# Patient Record
Sex: MALE | Race: WHITE | NOT HISPANIC OR LATINO | URBAN - METROPOLITAN AREA
[De-identification: names, ages, dates, MRNs, and addresses within clinical notes are randomized per-mention and may not be internally consistent; named-entity substitution may affect disease eponyms.]

---

## 2017-12-21 ENCOUNTER — IMPORTED ENCOUNTER (OUTPATIENT)
Dept: URBAN - METROPOLITAN AREA CLINIC 38 | Facility: CLINIC | Age: 32
End: 2017-12-21

## 2017-12-21 PROBLEM — H40.013 OPEN ANGLE WITH BORDERLINE FINDINGS, LOW RISK, BILATERAL: Noted: 2017-12-21

## 2017-12-21 PROBLEM — H52.13 MYOPIA, BILATERAL: Noted: 2017-12-21

## 2017-12-21 PROCEDURE — 92015 DETERMINE REFRACTIVE STATE: CPT

## 2018-12-21 ENCOUNTER — IMPORTED ENCOUNTER (OUTPATIENT)
Dept: URBAN - METROPOLITAN AREA CLINIC 38 | Facility: CLINIC | Age: 33
End: 2018-12-21

## 2018-12-21 PROBLEM — H40.013 OPEN ANGLE WITH BORDERLINE FINDINGS, LOW RISK, BILATERAL: Noted: 2018-12-21

## 2018-12-21 PROBLEM — H52.13 MYOPIA, BILATERAL: Noted: 2018-12-21

## 2018-12-21 PROCEDURE — 92014 COMPRE OPH EXAM EST PT 1/>: CPT

## 2020-01-16 ENCOUNTER — IMPORTED ENCOUNTER (OUTPATIENT)
Dept: URBAN - METROPOLITAN AREA CLINIC 38 | Facility: CLINIC | Age: 35
End: 2020-01-16

## 2020-01-16 PROBLEM — H40.013 OPEN ANGLE WITH BORDERLINE FINDINGS, LOW RISK, BILATERAL: Noted: 2020-01-16

## 2020-01-16 PROBLEM — H52.13 MYOPIA, BILATERAL: Noted: 2020-01-16

## 2020-01-16 PROCEDURE — 92083 EXTENDED VISUAL FIELD XM: CPT

## 2020-01-16 PROCEDURE — 92014 COMPRE OPH EXAM EST PT 1/>: CPT

## 2021-01-08 ENCOUNTER — IMPORTED ENCOUNTER (OUTPATIENT)
Dept: URBAN - METROPOLITAN AREA CLINIC 38 | Facility: CLINIC | Age: 36
End: 2021-01-08

## 2021-01-08 PROBLEM — H52.13 MYOPIA: Noted: 2021-01-08

## 2021-01-08 PROBLEM — H40.013 GLAUCOMA SUSPECT, LOW RISK (OPTIC NERVE): Noted: 2021-01-08

## 2021-01-08 PROBLEM — H40.013 OPEN ANGLE WITH BORDERLINE FINDINGS, LOW RISK, BILATERAL: Noted: 2021-01-08

## 2021-01-08 PROBLEM — H52.13 MYOPIA, BILATERAL: Noted: 2021-01-08

## 2021-01-08 PROCEDURE — 92015 DETERMINE REFRACTIVE STATE: CPT

## 2021-01-08 PROCEDURE — 92014 COMPRE OPH EXAM EST PT 1/>: CPT

## 2022-04-15 ENCOUNTER — HOSPITAL ENCOUNTER (EMERGENCY)
Facility: HOSPITAL | Age: 37
Discharge: HOME | End: 2022-04-15
Attending: EMERGENCY MEDICINE
Payer: COMMERCIAL

## 2022-04-15 ENCOUNTER — APPOINTMENT (EMERGENCY)
Dept: RADIOLOGY | Facility: HOSPITAL | Age: 37
End: 2022-04-15
Payer: COMMERCIAL

## 2022-04-15 VITALS
DIASTOLIC BLOOD PRESSURE: 74 MMHG | HEART RATE: 63 BPM | TEMPERATURE: 97.7 F | OXYGEN SATURATION: 99 % | RESPIRATION RATE: 18 BRPM | HEIGHT: 70 IN | BODY MASS INDEX: 23.05 KG/M2 | WEIGHT: 161 LBS | SYSTOLIC BLOOD PRESSURE: 131 MMHG

## 2022-04-15 DIAGNOSIS — S42.021A CLOSED DISPLACED FRACTURE OF SHAFT OF RIGHT CLAVICLE, INITIAL ENCOUNTER: ICD-10-CM
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
## 2022-04-15 PROCEDURE — 74176 CT ABD & PELVIS W/O CONTRAST: CPT

## 2022-04-15 PROCEDURE — G1004 CDSM NDSC: HCPCS

## 2022-04-15 PROCEDURE — 71250 CT THORAX DX C-: CPT

## 2022-04-15 PROCEDURE — 99284 EMERGENCY DEPT VISIT MOD MDM: CPT

## 2022-04-15 PROCEDURE — 73060 X-RAY EXAM OF HUMERUS: CPT

## 2022-04-15 PROCEDURE — 96374 THER/PROPH/DIAG INJ IV PUSH: CPT

## 2022-04-15 PROCEDURE — 96372 THER/PROPH/DIAG INJ SC/IM: CPT

## 2022-04-15 PROCEDURE — 73502 X-RAY EXAM HIP UNI 2-3 VIEWS: CPT

## 2022-04-15 PROCEDURE — 73000 X-RAY EXAM OF COLLAR BONE: CPT

## 2022-04-15 PROCEDURE — 99284 EMERGENCY DEPT VISIT MOD MDM: CPT | Performed by: PHYSICIAN ASSISTANT

## 2022-04-15 PROCEDURE — 73564 X-RAY EXAM KNEE 4 OR MORE: CPT

## 2022-04-15 RX ORDER — OXYCODONE HYDROCHLORIDE 5 MG/1
5 TABLET ORAL EVERY 4 HOURS PRN
Qty: 20 TABLET | Refills: 0 | Status: SHIPPED | OUTPATIENT
Start: 2022-04-15 | End: 2022-04-20

## 2022-04-15 RX ORDER — HYDROMORPHONE HCL/PF 1 MG/ML
0.5 SYRINGE (ML) INJECTION ONCE
Status: COMPLETED | OUTPATIENT
Start: 2022-04-15 | End: 2022-04-15

## 2022-04-15 RX ORDER — IBUPROFEN 600 MG/1
600 TABLET ORAL EVERY 6 HOURS PRN
Qty: 30 TABLET | Refills: 0 | Status: SHIPPED | OUTPATIENT
Start: 2022-04-15

## 2022-04-15 RX ORDER — ACETAMINOPHEN 325 MG/1
650 TABLET ORAL EVERY 6 HOURS
Qty: 112 TABLET | Refills: 0 | Status: SHIPPED | OUTPATIENT
Start: 2022-04-15 | End: 2022-04-29

## 2022-04-15 RX ORDER — KETOROLAC TROMETHAMINE 30 MG/ML
15 INJECTION, SOLUTION INTRAMUSCULAR; INTRAVENOUS ONCE
Status: COMPLETED | OUTPATIENT
Start: 2022-04-15 | End: 2022-04-15

## 2022-04-15 RX ADMIN — HYDROMORPHONE HYDROCHLORIDE 0.5 MG: 1 INJECTION, SOLUTION INTRAMUSCULAR; INTRAVENOUS; SUBCUTANEOUS at 15:43

## 2022-04-15 RX ADMIN — HYDROMORPHONE HYDROCHLORIDE 0.5 MG: 1 INJECTION, SOLUTION INTRAMUSCULAR; INTRAVENOUS; SUBCUTANEOUS at 17:01

## 2022-04-15 RX ADMIN — KETOROLAC TROMETHAMINE 15 MG: 30 INJECTION, SOLUTION INTRAMUSCULAR at 15:39

## 2022-04-15 NOTE — Clinical Note
Francisca Yo was seen and treated in our emergency department on 4/15/2022  No work until cleared by Family Doctor/Orthopedics        Diagnosis:     Stefany Jansen    He may return on this date: If you have any questions or concerns, please don't hesitate to call        Serenity Roper PA-C    ______________________________           _______________          _______________  Hospital Representative                              Date                                Time
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
## 2022-04-15 NOTE — ED NOTES
Sling and shoulder immobilizer applied to pt  Pt instructed on its use  Pt demonstrated understanding       Asif French RN  04/15/22 9424

## 2022-06-18 ASSESSMENT — KERATOMETRY
OD_K1POWER_DIOPTERS: 40.50
OS_K1POWER_DIOPTERS: 39.75
OD_AXISANGLE_DEGREES: 178
OS_K1POWER_DIOPTERS: 41.00
OD_AXISANGLE_DEGREES: 2
OS_AXISANGLE_DEGREES: 156
OD_K2POWER_DIOPTERS: 41.25
OD_AXISANGLE2_DEGREES: 92
OS_AXISANGLE2_DEGREES: 66
OS_K1POWER_DIOPTERS: 40.75
OD_K1POWER_DIOPTERS: 41.00
OS_AXISANGLE_DEGREES: 169
OD_AXISANGLE_DEGREES: 171
OS_K2POWER_DIOPTERS: 40.00
OS_AXISANGLE_DEGREES: 140
OS_AXISANGLE2_DEGREES: 70
OS_K2POWER_DIOPTERS: 41.25
OD_K2POWER_DIOPTERS: 41.25
OD_AXISANGLE2_DEGREES: 81
OS_AXISANGLE2_DEGREES: 50
OD_AXISANGLE2_DEGREES: 88
OD_K1POWER_DIOPTERS: 40.00
OD_AXISANGLE2_DEGREES: 90
OS_AXISANGLE2_DEGREES: 79
OD_K2POWER_DIOPTERS: 42.25
OS_AXISANGLE_DEGREES: 160
OS_K2POWER_DIOPTERS: 41.50
OD_K1POWER_DIOPTERS: 40.25
OS_K1POWER_DIOPTERS: 41.25
OS_K2POWER_DIOPTERS: 41.75
OD_K2POWER_DIOPTERS: 40.75
OD_AXISANGLE_DEGREES: 180

## 2022-06-18 ASSESSMENT — VISUAL ACUITY
OD_CC: 20/20
OD_CC: 20/20
OS_CC: 20/20
OS_CC: 20/20-
OD_CC: 20/20
OS_CC: J1
OS_CC: 20/20-
OD_CC: J1

## 2022-06-18 ASSESSMENT — TONOMETRY
OD_IOP_MMHG: 18
OD_IOP_MMHG: 17
OD_IOP_MMHG: 13
OS_IOP_MMHG: 19
OS_IOP_MMHG: 14
OS_IOP_MMHG: 19

## 2023-03-02 ENCOUNTER — ESTABLISHED COMPREHENSIVE EXAM (OUTPATIENT)
Dept: URBAN - METROPOLITAN AREA CLINIC 38 | Facility: CLINIC | Age: 38
End: 2023-03-02

## 2023-03-02 DIAGNOSIS — H52.13: ICD-10-CM

## 2023-03-02 DIAGNOSIS — H40.013: ICD-10-CM

## 2023-03-02 PROCEDURE — 92015 DETERMINE REFRACTIVE STATE: CPT

## 2023-03-02 PROCEDURE — 92310 CONTACT LENS FITTING OU: CPT

## 2023-03-02 PROCEDURE — 92083 EXTENDED VISUAL FIELD XM: CPT

## 2023-03-02 PROCEDURE — 92014 COMPRE OPH EXAM EST PT 1/>: CPT

## 2023-03-02 ASSESSMENT — VISUAL ACUITY
OD_CC: J1
OS_CC: J1
OS_CC: 20/20
OD_CC: 20/20

## 2023-03-02 ASSESSMENT — KERATOMETRY
OS_AXISANGLE2_DEGREES: 66
OD_K2POWER_DIOPTERS: 41.25
OD_AXISANGLE_DEGREES: 171
OD_AXISANGLE2_DEGREES: 81
OD_K1POWER_DIOPTERS: 40.50
OS_AXISANGLE_DEGREES: 156
OS_K2POWER_DIOPTERS: 41.75
OS_K1POWER_DIOPTERS: 41.25

## 2023-03-02 ASSESSMENT — TONOMETRY
OS_IOP_MMHG: 17
OD_IOP_MMHG: 17

## 2024-03-07 ENCOUNTER — ESTABLISHED COMPREHENSIVE EXAM (OUTPATIENT)
Dept: URBAN - METROPOLITAN AREA CLINIC 38 | Facility: CLINIC | Age: 39
End: 2024-03-07

## 2024-03-07 DIAGNOSIS — H52.13: ICD-10-CM

## 2024-03-07 DIAGNOSIS — H40.013: ICD-10-CM

## 2024-03-07 PROCEDURE — 92133 CPTRZD OPH DX IMG PST SGM ON: CPT

## 2024-03-07 PROCEDURE — 92015 DETERMINE REFRACTIVE STATE: CPT

## 2024-03-07 PROCEDURE — 92014 COMPRE OPH EXAM EST PT 1/>: CPT

## 2024-03-07 PROCEDURE — 92310 CONTACT LENS FITTING OU: CPT

## 2024-03-07 ASSESSMENT — TONOMETRY
OS_IOP_MMHG: 18
OS_IOP_MMHG: 18
OD_IOP_MMHG: 13
OD_IOP_MMHG: 18

## 2024-03-07 ASSESSMENT — KERATOMETRY
OD_AXISANGLE_DEGREES: 171
OS_K2POWER_DIOPTERS: 41.75
OS_AXISANGLE2_DEGREES: 66
OS_AXISANGLE_DEGREES: 156
OD_K1POWER_DIOPTERS: 40.50
OD_K2POWER_DIOPTERS: 41.25
OD_AXISANGLE2_DEGREES: 81
OS_K1POWER_DIOPTERS: 41.25

## 2024-03-07 ASSESSMENT — VISUAL ACUITY
OS_CC: 20/20
OD_CC: J1
OS_CC: J1
OD_CC: 20/20